# Patient Record
Sex: MALE | Race: WHITE | Employment: FULL TIME | URBAN - NONMETROPOLITAN AREA
[De-identification: names, ages, dates, MRNs, and addresses within clinical notes are randomized per-mention and may not be internally consistent; named-entity substitution may affect disease eponyms.]

---

## 2018-08-20 ENCOUNTER — TELEPHONE (OUTPATIENT)
Dept: PEDIATRICS | Facility: OTHER | Age: 38
End: 2018-08-20

## 2018-08-20 NOTE — TELEPHONE ENCOUNTER
9:04 AM    Reason for Call: Phone Call    Description: Itz's  has been exposed to strep by wife who was seen by Dr. Suazo last week and their two children also. Wife Lindsay is wondering if a prescription could be called in for him. He is allergic to penicillin Please call Lindsay to advise    Was an appointment offered for this call    No    Preferred method for responding to this message: [phone]    If we cannot reach you directly, may we leave a detailed response at the number you provided?   Yes      Lashonda Caraballo

## 2019-10-10 NOTE — PROGRESS NOTES
Subjective     Itz Deras is a 39 year old male who presents to clinic today for the following health issues:    HPI   New Patient/Transfer of Care  Spots on face       Duration: 6 months     Description (location/character/radiation): mild but now worse     Intensity:  moderate    Accompanying signs and symptoms: see below    History (similar episodes/previous evaluation): None    Precipitating or alleviating factors: thought possible wind burn but unsure    Therapies tried and outcome: lotion, vasoline, Clindamycin and was on prednisone but now off      - started 6 months ago  - resolved w/o intervention then returned  - just stays on cheeks and chin  - prednisone resolved symptoms, then 2 days after stopping prednisone symptoms returned  - no h/o tic bites, but does ATV-ing  - clindamycin made it worse  - occasionally blisters  - lotion does not help  - cream for ringworm didn't work  - burns / itches / flakes  - Comes and goes  - no fevers / chills  - no joint aches / muscle aches  - no changes in facial products  - FHx of sister w/ MS      Patient Active Problem List   Diagnosis     Skin lesion     History reviewed. No pertinent surgical history.    Social History     Tobacco Use     Smoking status: Never Smoker     Smokeless tobacco: Never Used   Substance Use Topics     Alcohol use: Yes     Comment: Social     Family History   Problem Relation Age of Onset     Alcoholism Father      Multiple Sclerosis Sister      Multiple Sclerosis Paternal Aunt          Current Outpatient Medications   Medication Sig Dispense Refill     predniSONE (DELTASONE) 5 MG tablet Take 4 tablets (20 mg) by mouth daily for 5 days, THEN 2 tablets (10 mg) daily for 3 days, THEN 1 tablet (5 mg) daily for 3 days, THEN 0.5 tablets (2.5 mg) daily for 3 days. 31 tablet 0     clindamycin (CLEOCIN T) 1 % external lotion APPLY TO AFFECTED AREA TWICE A DAY AS NEEDED  0     Allergies   Allergen Reactions     Penicillins      Took at age 13  resulting in hospitalization     BP Readings from Last 3 Encounters:   10/21/19 126/82    Wt Readings from Last 3 Encounters:   10/21/19 101 kg (222 lb 9.6 oz)               Reviewed and updated as needed this visit by Provider  Tobacco  Allergies  Meds  Problems  Med Hx  Surg Hx  Fam Hx  Soc Hx          Review of Systems   Constitutional: Negative for chills and fever.   HENT: Negative for congestion, ear pain, hearing loss and sore throat.    Eyes: Negative for pain and visual disturbance.   Respiratory: Negative for cough and shortness of breath.    Cardiovascular: Negative for chest pain, palpitations and peripheral edema.   Gastrointestinal: Negative for abdominal pain, constipation, diarrhea, heartburn, hematochezia and nausea.   Genitourinary: Negative for dysuria, frequency, hematuria and urgency.   Musculoskeletal: Negative for arthralgias, joint swelling and myalgias.   Skin: Positive for rash.   Neurological: Negative for dizziness, weakness, headaches and paresthesias.   Psychiatric/Behavioral: Negative for mood changes. The patient is not nervous/anxious.             Objective    /82 (BP Location: Left arm, Patient Position: Sitting, Cuff Size: Adult Large)   Pulse 97   Temp 97.9  F (36.6  C) (Tympanic)   Resp 16   Wt 101 kg (222 lb 9.6 oz)   SpO2 98%   There is no height or weight on file to calculate BMI.  Physical Exam  Constitutional:       General: He is not in acute distress.     Appearance: He is well-developed.   HENT:      Head: Normocephalic and atraumatic.      Right Ear: Tympanic membrane normal.      Left Ear: Tympanic membrane normal.      Mouth/Throat:      Mouth: Mucous membranes are moist.      Pharynx: No oropharyngeal exudate.   Eyes:      Extraocular Movements: Extraocular movements intact.      Conjunctiva/sclera: Conjunctivae normal.      Pupils: Pupils are equal, round, and reactive to light.   Neck:      Musculoskeletal: Normal range of motion and neck supple.       Thyroid: No thyromegaly.   Cardiovascular:      Rate and Rhythm: Normal rate and regular rhythm.      Pulses: Normal pulses.      Heart sounds: No murmur.   Pulmonary:      Effort: Pulmonary effort is normal. No respiratory distress.      Breath sounds: No wheezing or rales.   Abdominal:      General: Bowel sounds are normal. There is no distension.      Palpations: Abdomen is soft.      Tenderness: There is no tenderness. There is no guarding.   Musculoskeletal: Normal range of motion.   Lymphadenopathy:      Cervical: No cervical adenopathy.   Skin:     General: Skin is warm and dry.      Comments: Cheeks: erythema, slightly raised, not blanchable patches.   Beard and sideburns with some pustules    Neurological:      Mental Status: He is alert.   Psychiatric:         Mood and Affect: Mood normal.            Diagnostic Test Results:  Results for orders placed or performed in visit on 10/21/19 (from the past 24 hour(s))   CBC with platelets differential   Result Value Ref Range    WBC 7.3 4.0 - 11.0 10e9/L    RBC Count 5.33 4.4 - 5.9 10e12/L    Hemoglobin 15.6 13.3 - 17.7 g/dL    Hematocrit 46.3 40.0 - 53.0 %    MCV 87 78 - 100 fl    MCH 29.3 26.5 - 33.0 pg    MCHC 33.7 31.5 - 36.5 g/dL    RDW 13.2 10.0 - 15.0 %    Platelet Count 343 150 - 450 10e9/L    Diff Method Automated Method     % Neutrophils 61.0 %    % Lymphocytes 29.2 %    % Monocytes 6.6 %    % Eosinophils 2.1 %    % Basophils 0.8 %    % Immature Granulocytes 0.3 %    Nucleated RBCs 0 0 /100    Absolute Neutrophil 4.5 1.6 - 8.3 10e9/L    Absolute Lymphocytes 2.1 0.8 - 5.3 10e9/L    Absolute Monocytes 0.5 0.0 - 1.3 10e9/L    Absolute Eosinophils 0.2 0.0 - 0.7 10e9/L    Absolute Basophils 0.1 0.0 - 0.2 10e9/L    Abs Immature Granulocytes 0.0 0 - 0.4 10e9/L    Absolute Nucleated RBC 0.0    Comprehensive metabolic panel   Result Value Ref Range    Sodium 140 133 - 144 mmol/L    Potassium 4.2 3.4 - 5.3 mmol/L    Chloride 106 94 - 109 mmol/L    Carbon  Dioxide 30 20 - 32 mmol/L    Anion Gap 4 3 - 14 mmol/L    Glucose 88 70 - 99 mg/dL    Urea Nitrogen 11 7 - 30 mg/dL    Creatinine 0.78 0.66 - 1.25 mg/dL    GFR Estimate >90 >60 mL/min/[1.73_m2]    GFR Estimate If Black >90 >60 mL/min/[1.73_m2]    Calcium 9.6 8.5 - 10.1 mg/dL    Bilirubin Total 0.6 0.2 - 1.3 mg/dL    Albumin 4.3 3.4 - 5.0 g/dL    Protein Total 8.2 6.8 - 8.8 g/dL    Alkaline Phosphatase 58 40 - 150 U/L    ALT 41 0 - 70 U/L    AST 22 0 - 45 U/L         FUAD pending    Assessment & Plan     1. Rash and nonspecific skin eruption  ddx includes dermatitis (most likely d/t improvement w/ oral steroids), lupus (d/t FHx of autoimmune), cellulitis / erysipelas (less likely d/t worsening with clindamycin), impetigo (no crusting), tic etiology (unlikely d/t no systemic symptoms). Will do longer steroid course with taper for consideration of autoimmune dermatitis   - Anti Nuclear Lisa IgG by IFA with Reflex  - CBC with platelets differential  - Comprehensive metabolic panel  - predniSONE (DELTASONE) 5 MG tablet; Take 4 tablets (20 mg) by mouth daily for 5 days, THEN 2 tablets (10 mg) daily for 3 days, THEN 1 tablet (5 mg) daily for 3 days, THEN 0.5 tablets (2.5 mg) daily for 3 days.  Dispense: 31 tablet; Refill: 0  - DERMATOLOGY REFERRAL     See Patient Instructions    Return in about 1 month (around 11/21/2019).    sEthela Canseco MD  [facility] [place]

## 2019-10-21 ENCOUNTER — OFFICE VISIT (OUTPATIENT)
Dept: FAMILY MEDICINE | Facility: OTHER | Age: 39
End: 2019-10-21
Attending: FAMILY MEDICINE
Payer: COMMERCIAL

## 2019-10-21 VITALS
OXYGEN SATURATION: 98 % | DIASTOLIC BLOOD PRESSURE: 82 MMHG | HEART RATE: 97 BPM | RESPIRATION RATE: 16 BRPM | WEIGHT: 222.6 LBS | SYSTOLIC BLOOD PRESSURE: 126 MMHG | TEMPERATURE: 97.9 F

## 2019-10-21 DIAGNOSIS — R21 RASH AND NONSPECIFIC SKIN ERUPTION: Primary | ICD-10-CM

## 2019-10-21 PROBLEM — L98.9 SKIN LESION: Status: ACTIVE | Noted: 2019-10-21

## 2019-10-21 LAB
ALBUMIN SERPL-MCNC: 4.3 G/DL (ref 3.4–5)
ALP SERPL-CCNC: 58 U/L (ref 40–150)
ALT SERPL W P-5'-P-CCNC: 41 U/L (ref 0–70)
ANION GAP SERPL CALCULATED.3IONS-SCNC: 4 MMOL/L (ref 3–14)
AST SERPL W P-5'-P-CCNC: 22 U/L (ref 0–45)
BASOPHILS # BLD AUTO: 0.1 10E9/L (ref 0–0.2)
BASOPHILS NFR BLD AUTO: 0.8 %
BILIRUB SERPL-MCNC: 0.6 MG/DL (ref 0.2–1.3)
BUN SERPL-MCNC: 11 MG/DL (ref 7–30)
CALCIUM SERPL-MCNC: 9.6 MG/DL (ref 8.5–10.1)
CHLORIDE SERPL-SCNC: 106 MMOL/L (ref 94–109)
CO2 SERPL-SCNC: 30 MMOL/L (ref 20–32)
CREAT SERPL-MCNC: 0.78 MG/DL (ref 0.66–1.25)
DIFFERENTIAL METHOD BLD: NORMAL
EOSINOPHIL # BLD AUTO: 0.2 10E9/L (ref 0–0.7)
EOSINOPHIL NFR BLD AUTO: 2.1 %
ERYTHROCYTE [DISTWIDTH] IN BLOOD BY AUTOMATED COUNT: 13.2 % (ref 10–15)
GFR SERPL CREATININE-BSD FRML MDRD: >90 ML/MIN/{1.73_M2}
GLUCOSE SERPL-MCNC: 88 MG/DL (ref 70–99)
HCT VFR BLD AUTO: 46.3 % (ref 40–53)
HGB BLD-MCNC: 15.6 G/DL (ref 13.3–17.7)
IMM GRANULOCYTES # BLD: 0 10E9/L (ref 0–0.4)
IMM GRANULOCYTES NFR BLD: 0.3 %
LYMPHOCYTES # BLD AUTO: 2.1 10E9/L (ref 0.8–5.3)
LYMPHOCYTES NFR BLD AUTO: 29.2 %
MCH RBC QN AUTO: 29.3 PG (ref 26.5–33)
MCHC RBC AUTO-ENTMCNC: 33.7 G/DL (ref 31.5–36.5)
MCV RBC AUTO: 87 FL (ref 78–100)
MONOCYTES # BLD AUTO: 0.5 10E9/L (ref 0–1.3)
MONOCYTES NFR BLD AUTO: 6.6 %
NEUTROPHILS # BLD AUTO: 4.5 10E9/L (ref 1.6–8.3)
NEUTROPHILS NFR BLD AUTO: 61 %
NRBC # BLD AUTO: 0 10*3/UL
NRBC BLD AUTO-RTO: 0 /100
PLATELET # BLD AUTO: 343 10E9/L (ref 150–450)
POTASSIUM SERPL-SCNC: 4.2 MMOL/L (ref 3.4–5.3)
PROT SERPL-MCNC: 8.2 G/DL (ref 6.8–8.8)
RBC # BLD AUTO: 5.33 10E12/L (ref 4.4–5.9)
SODIUM SERPL-SCNC: 140 MMOL/L (ref 133–144)
WBC # BLD AUTO: 7.3 10E9/L (ref 4–11)

## 2019-10-21 PROCEDURE — 86038 ANTINUCLEAR ANTIBODIES: CPT | Performed by: FAMILY MEDICINE

## 2019-10-21 PROCEDURE — 36415 COLL VENOUS BLD VENIPUNCTURE: CPT | Performed by: FAMILY MEDICINE

## 2019-10-21 PROCEDURE — 85025 COMPLETE CBC W/AUTO DIFF WBC: CPT | Performed by: FAMILY MEDICINE

## 2019-10-21 PROCEDURE — 80053 COMPREHEN METABOLIC PANEL: CPT | Performed by: FAMILY MEDICINE

## 2019-10-21 PROCEDURE — 99202 OFFICE O/P NEW SF 15 MIN: CPT | Performed by: FAMILY MEDICINE

## 2019-10-21 RX ORDER — PREDNISONE 5 MG/1
TABLET ORAL
Qty: 31 TABLET | Refills: 0 | Status: SHIPPED | OUTPATIENT
Start: 2019-10-21 | End: 2019-11-25

## 2019-10-21 RX ORDER — CLINDAMYCIN PHOSPHATE 10 UG/ML
LOTION TOPICAL
Refills: 0 | COMMUNITY
Start: 2019-10-15 | End: 2019-11-25

## 2019-10-21 ASSESSMENT — ENCOUNTER SYMPTOMS
HEADACHES: 0
ABDOMINAL PAIN: 0
ARTHRALGIAS: 0
NERVOUS/ANXIOUS: 0
FEVER: 0
PARESTHESIAS: 0
FREQUENCY: 0
JOINT SWELLING: 0
HEMATURIA: 0
HEMATOCHEZIA: 0
DIZZINESS: 0
HEARTBURN: 0
SORE THROAT: 0
SHORTNESS OF BREATH: 0
EYE PAIN: 0
PALPITATIONS: 0
DYSURIA: 0
CHILLS: 0
DIARRHEA: 0
WEAKNESS: 0
COUGH: 0
NAUSEA: 0
MYALGIAS: 0
CONSTIPATION: 0

## 2019-10-21 ASSESSMENT — ANXIETY QUESTIONNAIRES
2. NOT BEING ABLE TO STOP OR CONTROL WORRYING: SEVERAL DAYS
IF YOU CHECKED OFF ANY PROBLEMS ON THIS QUESTIONNAIRE, HOW DIFFICULT HAVE THESE PROBLEMS MADE IT FOR YOU TO DO YOUR WORK, TAKE CARE OF THINGS AT HOME, OR GET ALONG WITH OTHER PEOPLE: SOMEWHAT DIFFICULT
7. FEELING AFRAID AS IF SOMETHING AWFUL MIGHT HAPPEN: NOT AT ALL
1. FEELING NERVOUS, ANXIOUS, OR ON EDGE: SEVERAL DAYS
4. TROUBLE RELAXING: MORE THAN HALF THE DAYS
5. BEING SO RESTLESS THAT IT IS HARD TO SIT STILL: NOT AT ALL
GAD7 TOTAL SCORE: 6
6. BECOMING EASILY ANNOYED OR IRRITABLE: MORE THAN HALF THE DAYS
3. WORRYING TOO MUCH ABOUT DIFFERENT THINGS: NOT AT ALL

## 2019-10-21 ASSESSMENT — PAIN SCALES - GENERAL: PAINLEVEL: MILD PAIN (2)

## 2019-10-21 ASSESSMENT — PATIENT HEALTH QUESTIONNAIRE - PHQ9: SUM OF ALL RESPONSES TO PHQ QUESTIONS 1-9: 2

## 2019-10-21 NOTE — PATIENT INSTRUCTIONS
We will call you with your lab results  Start your prednisone taper.  We placed a dermatology referral to [facility] [phone]

## 2019-10-22 ASSESSMENT — ANXIETY QUESTIONNAIRES: GAD7 TOTAL SCORE: 6

## 2019-10-23 LAB — ANA SER QL IF: NEGATIVE

## 2019-10-31 ENCOUNTER — TELEPHONE (OUTPATIENT)
Dept: FAMILY MEDICINE | Facility: OTHER | Age: 39
End: 2019-10-31

## 2019-10-31 DIAGNOSIS — R21 RASH AND NONSPECIFIC SKIN ERUPTION: Primary | ICD-10-CM

## 2019-10-31 RX ORDER — PREDNISONE 2.5 MG/1
TABLET ORAL
Qty: 7 TABLET | Refills: 0 | Status: SHIPPED | OUTPATIENT
Start: 2019-10-31 | End: 2019-11-25

## 2019-10-31 NOTE — TELEPHONE ENCOUNTER
Patient is taking Prednisone and has 3 pills left.  He reports the condition (rash)  is starting to return rapidly.  He would like a call back at [phone].  Thank you

## 2019-11-01 ENCOUNTER — TELEPHONE (OUTPATIENT)
Dept: FAMILY MEDICINE | Facility: OTHER | Age: 39
End: 2019-11-01

## 2019-11-19 NOTE — PROGRESS NOTES
Subjective     Itz Deras is a 39 year old male who presents to clinic today for the following health issues:    HPI   Rash      Duration: 1 month follow up     Description  Location: on face   Itching: no    Intensity:  moderate    Accompanying signs and symptoms: burning     History (similar episodes/previous evaluation): None    Precipitating or alleviating factors:  New exposures:  None  Recent travel: no      Therapies tried and outcome: lotion, vasoline, Clindamycin and was on prednisone but now off     - aveeno eczema lotion  - Itz is also taking vitamin D and  vitamin K2  - his rash resolves then flares, but has not gotten as severe    Patient Active Problem List   Diagnosis     Skin lesion     History reviewed. No pertinent surgical history.    Social History     Tobacco Use     Smoking status: Never Smoker     Smokeless tobacco: Never Used   Substance Use Topics     Alcohol use: Yes     Comment: Social     Family History   Problem Relation Age of Onset     Alcoholism Father      Multiple Sclerosis Sister      Multiple Sclerosis Paternal Aunt          No current outpatient medications on file.     Allergies   Allergen Reactions     Penicillins      Took at age 13 resulting in hospitalization     BP Readings from Last 3 Encounters:   11/25/19 112/70   10/21/19 126/82    Wt Readings from Last 3 Encounters:   11/25/19 106.6 kg (235 lb)   10/21/19 101 kg (222 lb 9.6 oz)            Reviewed and updated as needed this visit by Provider  Tobacco  Allergies  Meds  Problems  Med Hx  Surg Hx  Fam Hx         Review of Systems   Constitutional: Negative for chills and fever.   Respiratory: Negative for cough and shortness of breath.    Cardiovascular: Negative for chest pain and palpitations.   Gastrointestinal: Negative for abdominal pain.   Skin: Positive for rash.            Objective    /70 (BP Location: Left arm, Patient Position: Chair, Cuff Size: Adult Large)   Pulse 77   Temp 98.7  F  (37.1  C) (Tympanic)   Wt 106.6 kg (235 lb)   SpO2 99%   There is no height or weight on file to calculate BMI.  Physical Exam  Constitutional:       General: He is not in acute distress.     Appearance: He is not ill-appearing.   Cardiovascular:      Rate and Rhythm: Normal rate and regular rhythm.      Pulses: Normal pulses.      Heart sounds: No murmur.   Pulmonary:      Effort: Pulmonary effort is normal. No respiratory distress.      Breath sounds: No wheezing or rales.   Skin:     Comments: Cheeks: erythema, slightly raised, not blanchable patches, most on left cheek. Improved from previous exam     Neurological:      Mental Status: He is alert.       Diagnostic Test Results: none         Assessment & Plan     1. Skin lesion  ddx includes perioral dermitis, contact dermitis   - s/p steroid course  - c/w dermatology appt on 11/27      See Patient Instructions    Return if symptoms worsen or fail to improve.    Esthela Canseco MD  [facility] [place]

## 2019-11-25 ENCOUNTER — OFFICE VISIT (OUTPATIENT)
Dept: FAMILY MEDICINE | Facility: OTHER | Age: 39
End: 2019-11-25
Attending: FAMILY MEDICINE
Payer: COMMERCIAL

## 2019-11-25 VITALS
OXYGEN SATURATION: 99 % | TEMPERATURE: 98.7 F | SYSTOLIC BLOOD PRESSURE: 112 MMHG | WEIGHT: 235 LBS | HEART RATE: 77 BPM | DIASTOLIC BLOOD PRESSURE: 70 MMHG

## 2019-11-25 DIAGNOSIS — L98.9 SKIN LESION: Primary | ICD-10-CM

## 2019-11-25 PROCEDURE — 99213 OFFICE O/P EST LOW 20 MIN: CPT | Performed by: FAMILY MEDICINE

## 2019-11-25 ASSESSMENT — ENCOUNTER SYMPTOMS
SHORTNESS OF BREATH: 0
CHILLS: 0
COUGH: 0
ABDOMINAL PAIN: 0
PALPITATIONS: 0
FEVER: 0

## 2019-11-25 ASSESSMENT — PAIN SCALES - GENERAL: PAINLEVEL: NO PAIN (1)

## 2019-11-27 ENCOUNTER — TRANSFERRED RECORDS (OUTPATIENT)
Dept: HEALTH INFORMATION MANAGEMENT | Facility: CLINIC | Age: 39
End: 2019-11-27

## 2021-02-10 ENCOUNTER — TELEPHONE (OUTPATIENT)
Dept: FAMILY MEDICINE | Facility: OTHER | Age: 41
End: 2021-02-10

## 2021-02-10 NOTE — TELEPHONE ENCOUNTER
"Pt wife called and was wanting an appointment for him for a possible preformatted ear drum. She stated that he has \"crusties and it is not swollen, there is also no pain.\" I offered UC/ ER multiple times as this could be a serious issue. Denied multiple times to go to ER/UC. Scheduled for 3- as requested.  After scheduling I offered UC/ER and they denied again    "

## 2021-03-08 NOTE — PROGRESS NOTES
"    Assessment & Plan     Encounter for medical examination to establish care  No records to obtain.    Need for prophylactic vaccination and inoculation against influenza  Declined.    Immunization due  Declined tetanus.    Right ear pain  Resolved.  Discussed safe ear cleaning with bulb syringe.  Avoid q tips.    Lipid screening  Done at work - declines repeat here.    Screening for diabetes mellitus  Same as above.    Obesity (BMI 35.0-39.9 without comorbidity)  Counseled on diet, exercise, weight management.  Set goals, join group, use apps - accountability.  Declined nutrition consult.    Cough - not bothersome to patient.  Wife had mentioned.  Suspect post nasal drainage.  Discussed saline and nasal steroid use.        BMI:   Estimated body mass index is 36.34 kg/m  as calculated from the following:    Height as of this encounter: 1.727 m (5' 8\").    Weight as of this encounter: 108.4 kg (239 lb).   Weight management plan: Discussed healthy diet and exercise guidelines    Patient Instructions   Drop off copy of fasting labs - glucose, lipids.  Consider updating vaccines - tetanus - seasonal - flu, covid.  For post nasal drainage - nasal steroid spray - use consistently - ex:Flonase, nasonex, rhinocort.  Saline sinus rinses - Chas med.  Ear cleaning - avoid q tips - use bulb syringe, luke warm water, ear wax removal drops.  Continue efforts with diet, exercise, weight management.          Pratibha Cook MD  [facility] [place] Mobley is a 41 year old who presents for the following health issues     HPI     New Patient/Transfer of Care    Concern - ear pain  Onset: 1 week but resolved now  Description: bilateral - right ear, swollen and painful; q tip  Intensity: 0/10  Progression of Symptoms:  improved  Accompanying Signs & Symptoms: none  Previous history of similar problem: none  Precipitating factors:        Worsened by: nothing  Alleviating factors:        Improved by: " "resolved  Therapies tried and outcome:  none   Wife states he coughs too much.  Clearing throat.  Some drainage. No other.    Weight - stress eating - weight up - intermittent exercise.  Has home gym.    Had fasting labs last year - ok.  Fasting today - declines.  Works at [employer].          Review of Systems   Constitutional, HEENT, cardiovascular, pulmonary, gi and gu systems are negative, except as otherwise noted.      Objective    /78 (BP Location: Right arm, Patient Position: Sitting, Cuff Size: Adult Large)   Pulse 75   Temp 97.5  F (36.4  C) (Tympanic)   Resp 18   Ht 1.727 m (5' 8\")   Wt 108.4 kg (239 lb)   SpO2 96%   BMI 36.34 kg/m    Body mass index is 36.34 kg/m .  Physical Exam   GENERAL: alert, no distress and over weight  EYES: Eyes grossly normal to inspection, PERRL and conjunctivae and sclerae normal  HENT: ear canals and TM's normal, nose and mouth without ulcers or lesions  NECK: no adenopathy, no asymmetry, masses, or scars and thyroid normal to palpation  RESP: lungs clear to auscultation - no rales, rhonchi or wheezes  CV: regular rate and rhythm, normal S1 S2, no S3 or S4, no murmur, click or rub, no peripheral edema and peripheral pulses strong  MS: no gross musculoskeletal defects noted, no edema  PSYCH: mentation appears normal, affect normal/bright                "

## 2021-03-12 ENCOUNTER — OFFICE VISIT (OUTPATIENT)
Dept: FAMILY MEDICINE | Facility: OTHER | Age: 41
End: 2021-03-12
Attending: FAMILY MEDICINE
Payer: COMMERCIAL

## 2021-03-12 VITALS
DIASTOLIC BLOOD PRESSURE: 78 MMHG | OXYGEN SATURATION: 96 % | HEIGHT: 68 IN | WEIGHT: 239 LBS | HEART RATE: 75 BPM | RESPIRATION RATE: 18 BRPM | TEMPERATURE: 97.5 F | SYSTOLIC BLOOD PRESSURE: 120 MMHG | BODY MASS INDEX: 36.22 KG/M2

## 2021-03-12 DIAGNOSIS — Z23 IMMUNIZATION DUE: ICD-10-CM

## 2021-03-12 DIAGNOSIS — Z00.00 ENCOUNTER FOR MEDICAL EXAMINATION TO ESTABLISH CARE: Primary | ICD-10-CM

## 2021-03-12 DIAGNOSIS — Z23 NEED FOR PROPHYLACTIC VACCINATION AND INOCULATION AGAINST INFLUENZA: ICD-10-CM

## 2021-03-12 DIAGNOSIS — Z13.220 LIPID SCREENING: ICD-10-CM

## 2021-03-12 DIAGNOSIS — Z13.1 SCREENING FOR DIABETES MELLITUS: ICD-10-CM

## 2021-03-12 DIAGNOSIS — H92.01 RIGHT EAR PAIN: ICD-10-CM

## 2021-03-12 DIAGNOSIS — E66.9 OBESITY (BMI 35.0-39.9 WITHOUT COMORBIDITY): ICD-10-CM

## 2021-03-12 PROCEDURE — 99213 OFFICE O/P EST LOW 20 MIN: CPT | Performed by: FAMILY MEDICINE

## 2021-03-12 RX ORDER — ERGOCALCIFEROL (VITAMIN D2) 10 MCG
400 TABLET ORAL DAILY
COMMUNITY

## 2021-03-12 ASSESSMENT — MIFFLIN-ST. JEOR: SCORE: 1973.6

## 2021-03-12 ASSESSMENT — PAIN SCALES - GENERAL: PAINLEVEL: NO PAIN (0)

## 2021-03-12 NOTE — PATIENT INSTRUCTIONS
Drop off copy of fasting labs - glucose, lipids.  Consider updating vaccines - tetanus - seasonal - flu, covid.  For post nasal drainage - nasal steroid spray - use consistently - ex:Flonase, nasonex, rhinocort.  Saline sinus rinses - Chas med.  Ear cleaning - avoid q tips - use bulb syringe, luke warm water, ear wax removal drops.  Continue efforts with diet, exercise, weight management.

## 2021-03-12 NOTE — NURSING NOTE
"Chief Complaint   Patient presents with     Establish Care       Initial /78 (BP Location: Right arm, Patient Position: Sitting, Cuff Size: Adult Large)   Pulse 75   Temp 97.5  F (36.4  C) (Tympanic)   Resp 18   Ht 1.727 m (5' 8\")   Wt 108.4 kg (239 lb)   SpO2 96%   BMI 36.34 kg/m   Estimated body mass index is 36.34 kg/m  as calculated from the following:    Height as of this encounter: 1.727 m (5' 8\").    Weight as of this encounter: 108.4 kg (239 lb).  Medication Reconciliation: complete  Kasey Nevarez LPN  "

## 2021-04-20 ENCOUNTER — TELEPHONE (OUTPATIENT)
Dept: FAMILY MEDICINE | Facility: OTHER | Age: 41
End: 2021-04-20